# Patient Record
Sex: FEMALE | Race: WHITE | NOT HISPANIC OR LATINO | URBAN - METROPOLITAN AREA
[De-identification: names, ages, dates, MRNs, and addresses within clinical notes are randomized per-mention and may not be internally consistent; named-entity substitution may affect disease eponyms.]

---

## 2017-08-19 ENCOUNTER — EMERGENCY (EMERGENCY)
Facility: HOSPITAL | Age: 72
LOS: 1 days | Discharge: HOME | End: 2017-08-19
Attending: EMERGENCY MEDICINE | Admitting: EMERGENCY MEDICINE
Payer: MEDICARE

## 2017-08-19 VITALS
WEIGHT: 199.96 LBS | SYSTOLIC BLOOD PRESSURE: 159 MMHG | OXYGEN SATURATION: 95 % | TEMPERATURE: 97 F | DIASTOLIC BLOOD PRESSURE: 88 MMHG | HEART RATE: 113 BPM | RESPIRATION RATE: 18 BRPM

## 2017-08-19 VITALS
RESPIRATION RATE: 16 BRPM | DIASTOLIC BLOOD PRESSURE: 88 MMHG | TEMPERATURE: 98 F | OXYGEN SATURATION: 96 % | SYSTOLIC BLOOD PRESSURE: 147 MMHG | HEART RATE: 102 BPM

## 2017-08-19 DIAGNOSIS — S05.12XA CONTUSION OF EYEBALL AND ORBITAL TISSUES, LEFT EYE, INITIAL ENCOUNTER: ICD-10-CM

## 2017-08-19 DIAGNOSIS — S00.31XA ABRASION OF NOSE, INITIAL ENCOUNTER: ICD-10-CM

## 2017-08-19 DIAGNOSIS — Z96.653 PRESENCE OF ARTIFICIAL KNEE JOINT, BILATERAL: Chronic | ICD-10-CM
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
## 2017-08-19 DIAGNOSIS — C56.9 MALIGNANT NEOPLASM OF UNSPECIFIED OVARY: ICD-10-CM

## 2017-08-19 DIAGNOSIS — I10 ESSENTIAL (PRIMARY) HYPERTENSION: ICD-10-CM
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
## 2017-08-19 PROCEDURE — 90715 TDAP VACCINE 7 YRS/> IM: CPT

## 2017-08-19 PROCEDURE — 73130 X-RAY EXAM OF HAND: CPT | Mod: 26,LT

## 2017-08-19 PROCEDURE — 70486 CT MAXILLOFACIAL W/O DYE: CPT

## 2017-08-19 PROCEDURE — 90471 IMMUNIZATION ADMIN: CPT

## 2017-08-19 PROCEDURE — 70450 CT HEAD/BRAIN W/O DYE: CPT | Mod: 26

## 2017-08-19 PROCEDURE — 73562 X-RAY EXAM OF KNEE 3: CPT

## 2017-08-19 PROCEDURE — 70450 CT HEAD/BRAIN W/O DYE: CPT

## 2017-08-19 PROCEDURE — 99284 EMERGENCY DEPT VISIT MOD MDM: CPT | Mod: 25

## 2017-08-19 PROCEDURE — 72125 CT NECK SPINE W/O DYE: CPT | Mod: 26

## 2017-08-19 PROCEDURE — 72125 CT NECK SPINE W/O DYE: CPT

## 2017-08-19 PROCEDURE — 70486 CT MAXILLOFACIAL W/O DYE: CPT | Mod: 26

## 2017-08-19 PROCEDURE — 73130 X-RAY EXAM OF HAND: CPT

## 2017-08-19 PROCEDURE — 73562 X-RAY EXAM OF KNEE 3: CPT | Mod: 26,LT

## 2017-08-19 PROCEDURE — 99284 EMERGENCY DEPT VISIT MOD MDM: CPT

## 2017-08-19 RX ORDER — TETANUS TOXOID, REDUCED DIPHTHERIA TOXOID AND ACELLULAR PERTUSSIS VACCINE, ADSORBED 5; 2.5; 8; 8; 2.5 [IU]/.5ML; [IU]/.5ML; UG/.5ML; UG/.5ML; UG/.5ML
0.5 SUSPENSION INTRAMUSCULAR ONCE
Qty: 0 | Refills: 0 | Status: COMPLETED | OUTPATIENT
Start: 2017-08-19 | End: 2017-08-19

## 2017-08-19 RX ORDER — OXYCODONE AND ACETAMINOPHEN 5; 325 MG/1; MG/1
1 TABLET ORAL ONCE
Qty: 0 | Refills: 0 | Status: DISCONTINUED | OUTPATIENT
Start: 2017-08-19 | End: 2017-08-19

## 2017-08-19 RX ADMIN — OXYCODONE AND ACETAMINOPHEN 1 TABLET(S): 5; 325 TABLET ORAL at 20:28

## 2017-08-19 RX ADMIN — TETANUS TOXOID, REDUCED DIPHTHERIA TOXOID AND ACELLULAR PERTUSSIS VACCINE, ADSORBED 0.5 MILLILITER(S): 5; 2.5; 8; 8; 2.5 SUSPENSION INTRAMUSCULAR at 16:45

## 2017-08-19 RX ADMIN — OXYCODONE AND ACETAMINOPHEN 1 TABLET(S): 5; 325 TABLET ORAL at 20:03

## 2017-08-19 RX ADMIN — Medication 300 MILLIGRAM(S): at 20:03

## 2017-08-19 NOTE — ED PROVIDER NOTE - CARDIAC, MLM
Normal rate, regular rhythm.  Heart sounds S1, S2.  No murmurs, rubs or gallops. Port to left upper chest wall

## 2017-08-19 NOTE — ED PROVIDER NOTE - CARE PLAN
Principal Discharge DX:	Orbital contusion, left, initial encounter  Instructions for follow-up, activity and diet:	Return to the ED for any new or worsening symptoms  Take your medication as prescribed  Sleep with your head elevated at least 45 degrees   Ice to affected contusions on 20 min off 40 min to reduce pain and swelling   Elevate leg to reduce swelling   Finish all antibiotics prescribed  Percocet per label instructions as needed for pain do not drive when taking  Advance activity as tolerated   Bacitracin to affected abrasions 2 times a day  Follow up with your PMD in 2-3 days for a recheck  Secondary Diagnosis:	Frontal bone deformity  Secondary Diagnosis:	Abrasions of multiple sites

## 2017-08-19 NOTE — ED PROVIDER NOTE - PLAN OF CARE
Return to the ED for any new or worsening symptoms  Take your medication as prescribed  Sleep with your head elevated at least 45 degrees   Ice to affected contusions on 20 min off 40 min to reduce pain and swelling   Elevate leg to reduce swelling   Finish all antibiotics prescribed  Percocet per label instructions as needed for pain do not drive when taking  Advance activity as tolerated   Bacitracin to affected abrasions 2 times a day  Follow up with your PMD in 2-3 days for a recheck

## 2017-08-19 NOTE — ED ADULT NURSE NOTE - OBJECTIVE STATEMENT
Patient came in to ED c/o tripped and fall today. Noted with swelling, abrasion with bruise on the left frontal area, swelling on the nasal area also noted with abrasion on the left knee and small wound on the left big toe. Patient is awake, oriented x3.

## 2017-08-19 NOTE — ED PROVIDER NOTE - ENMT, MLM
Airway patent, Nasal mucosa clear. Mouth with normal mucosa. Throat has no vesicles, no oropharyngeal exudates and uvula is midline.  abrasion to nasal bridge with min swelling contusion and abrasion to mid left super orbital region EOMI no pain with eye movement   TMs clear

## 2017-08-19 NOTE — ED PROVIDER NOTE - MUSCULOSKELETAL, MLM
Abrasion to medial aspect left hand mid 5th metatarsal region sensation intact no TTP, FROM intact  +radial pulse, abrasion overlying left knee with ecchymosis and swelling, sensation intact +pedal pulse abrasion to nasal bridge with min swelling contusion and abrasion to mid left super orbital region

## 2017-08-19 NOTE — ED PROVIDER NOTE - OBJECTIVE STATEMENT
Pt is a 71 yo female who presents to the ED with a cc of mechanical fall.  Pt reports that she was walking on the grass and did not note a dip in the dirt.  She lost her balance and fell to the ground striking the left side of her face on cement.  Denies LOC.  Pt is not on blood thinners but reports that she takes Excedrin as needed.  Pt reports pain above her left eye but denies blurry vision.  She also reports tenderness to her nasal bridge and states that at scene she had active bleeding which has since resolved.  She further reports pain to her left knee.  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  Denies HA, blurry vision, neck pain, CP, SOB, abd pain, ext numbness.  Unsure of date of last Tetanus.  Pt is currently receiving IV chemo infusions for ovarian cancer.
